# Patient Record
Sex: MALE | Race: WHITE | Employment: OTHER | URBAN - METROPOLITAN AREA
[De-identification: names, ages, dates, MRNs, and addresses within clinical notes are randomized per-mention and may not be internally consistent; named-entity substitution may affect disease eponyms.]

---

## 2018-08-12 ENCOUNTER — TRANSFERRED RECORDS (OUTPATIENT)
Dept: HEALTH INFORMATION MANAGEMENT | Facility: CLINIC | Age: 73
End: 2018-08-12

## 2018-08-21 ENCOUNTER — TRANSFERRED RECORDS (OUTPATIENT)
Dept: HEALTH INFORMATION MANAGEMENT | Facility: CLINIC | Age: 73
End: 2018-08-21

## 2018-09-04 ENCOUNTER — MEDICAL CORRESPONDENCE (OUTPATIENT)
Dept: HEALTH INFORMATION MANAGEMENT | Facility: CLINIC | Age: 73
End: 2018-09-04

## 2018-09-11 ENCOUNTER — TRANSFERRED RECORDS (OUTPATIENT)
Dept: HEALTH INFORMATION MANAGEMENT | Facility: CLINIC | Age: 73
End: 2018-09-11

## 2019-01-01 ENCOUNTER — TRANSFERRED RECORDS (OUTPATIENT)
Dept: HEALTH INFORMATION MANAGEMENT | Facility: CLINIC | Age: 74
End: 2019-01-01

## 2019-03-28 ENCOUNTER — TRANSFERRED RECORDS (OUTPATIENT)
Dept: HEALTH INFORMATION MANAGEMENT | Facility: CLINIC | Age: 74
End: 2019-03-28

## 2020-01-01 ENCOUNTER — OFFICE VISIT (OUTPATIENT)
Dept: GASTROENTEROLOGY | Facility: CLINIC | Age: 75
End: 2020-01-01
Attending: INTERNAL MEDICINE
Payer: MEDICARE

## 2020-01-01 ENCOUNTER — PATIENT OUTREACH (OUTPATIENT)
Dept: GASTROENTEROLOGY | Facility: CLINIC | Age: 75
End: 2020-01-01

## 2020-01-01 ENCOUNTER — TRANSFERRED RECORDS (OUTPATIENT)
Dept: HEALTH INFORMATION MANAGEMENT | Facility: CLINIC | Age: 75
End: 2020-01-01

## 2020-01-01 ENCOUNTER — EXTERNAL ORDER RESULTS (OUTPATIENT)
Dept: GASTROENTEROLOGY | Facility: CLINIC | Age: 75
End: 2020-01-01

## 2020-01-01 ENCOUNTER — PRE VISIT (OUTPATIENT)
Dept: GASTROENTEROLOGY | Facility: CLINIC | Age: 75
End: 2020-01-01

## 2020-01-01 ENCOUNTER — TELEPHONE (OUTPATIENT)
Dept: GASTROENTEROLOGY | Facility: CLINIC | Age: 75
End: 2020-01-01

## 2020-01-01 VITALS
SYSTOLIC BLOOD PRESSURE: 125 MMHG | TEMPERATURE: 97.6 F | HEART RATE: 68 BPM | DIASTOLIC BLOOD PRESSURE: 74 MMHG | OXYGEN SATURATION: 96 % | WEIGHT: 286.1 LBS

## 2020-01-01 DIAGNOSIS — R18.8 CIRRHOSIS OF LIVER WITH ASCITES, UNSPECIFIED HEPATIC CIRRHOSIS TYPE (H): Primary | ICD-10-CM

## 2020-01-01 DIAGNOSIS — R18.8 CIRRHOSIS OF LIVER WITH ASCITES, UNSPECIFIED HEPATIC CIRRHOSIS TYPE (H): ICD-10-CM

## 2020-01-01 DIAGNOSIS — K74.60 CIRRHOSIS OF LIVER WITH ASCITES, UNSPECIFIED HEPATIC CIRRHOSIS TYPE (H): ICD-10-CM

## 2020-01-01 DIAGNOSIS — K74.60 CIRRHOSIS OF LIVER WITH ASCITES, UNSPECIFIED HEPATIC CIRRHOSIS TYPE (H): Primary | ICD-10-CM

## 2020-01-01 LAB
ALBUMIN SERPL-MCNC: 2.7 G/DL (ref 3.4–5)
ALBUMIN SERPL-MCNC: 3 G/DL
ALP SERPL-CCNC: 168 U/L (ref 40–150)
ALP SERPL-CCNC: 184 U/L
ALT SERPL W P-5'-P-CCNC: 42 U/L (ref 0–70)
ALT SERPL-CCNC: 48 U/L
ANION GAP SERPL CALCULATED.3IONS-SCNC: 10 MMOL/L
ANION GAP SERPL CALCULATED.3IONS-SCNC: 5 MMOL/L
ANION GAP SERPL CALCULATED.3IONS-SCNC: 7 MMOL/L
ANION GAP SERPL CALCULATED.3IONS-SCNC: 8 MMOL/L
ANION GAP SERPL CALCULATED.3IONS-SCNC: 8 MMOL/L (ref 3–14)
AST SERPL W P-5'-P-CCNC: 55 U/L (ref 0–45)
AST SERPL-CCNC: 62 U/L
BILIRUB DIRECT SERPL-MCNC: 0.9 MG/DL (ref 0–0.2)
BILIRUB SERPL-MCNC: 2.9 MG/DL (ref 0.2–1.3)
BILIRUB SERPL-MCNC: 4.8 MG/DL
BILIRUBIN DIRECT: 2.2 MG/DL
BUN SERPL-MCNC: 22 MG/DL
BUN SERPL-MCNC: 23 MG/DL
BUN SERPL-MCNC: 24 MG/DL (ref 7–30)
BUN SERPL-MCNC: 27 MG/DL
BUN SERPL-MCNC: 45 MG/DL
CALCIUM SERPL-MCNC: 9.2 MG/DL
CALCIUM SERPL-MCNC: 9.3 MG/DL
CALCIUM SERPL-MCNC: 9.5 MG/DL
CALCIUM SERPL-MCNC: 9.7 MG/DL (ref 8.5–10.1)
CALCIUM SERPL-MCNC: 9.9 MG/DL
CHLORIDE SERPL-SCNC: 106 MMOL/L (ref 94–109)
CHLORIDE SERPLBLD-SCNC: 102 MMOL/L
CHLORIDE SERPLBLD-SCNC: 103 MMOL/L
CHLORIDE SERPLBLD-SCNC: 103 MMOL/L
CHLORIDE SERPLBLD-SCNC: 104 MMOL/L
CO2 SERPL-SCNC: 22 MMOL/L
CO2 SERPL-SCNC: 23 MMOL/L (ref 20–32)
CO2 SERPL-SCNC: 25 MMOL/L
CO2 SERPL-SCNC: 26 MMOL/L
CO2 SERPL-SCNC: 28 MMOL/L
CREAT SERPL-MCNC: 1.14 MG/DL
CREAT SERPL-MCNC: 1.15 MG/DL (ref 0.66–1.25)
CREAT SERPL-MCNC: 1.36 MG/DL
CREAT SERPL-MCNC: 1.38 MG/DL
CREAT SERPL-MCNC: 1.39 MG/DL
ERYTHROCYTE [DISTWIDTH] IN BLOOD BY AUTOMATED COUNT: 16.9 % (ref 10–15)
GFR SERPL CREATININE-BSD FRML MDRD: 50 ML/MIN/1.73M2
GFR SERPL CREATININE-BSD FRML MDRD: 51 ML/MIN/1.73M2
GFR SERPL CREATININE-BSD FRML MDRD: 51 ML/MIN/1.73M2
GFR SERPL CREATININE-BSD FRML MDRD: 63 ML/MIN/{1.73_M2}
GFR SERPL CREATININE-BSD FRML MDRD: >60 ML/MIN/1.73M2
GLUCOSE SERPL-MCNC: 109 MG/DL (ref 70–99)
GLUCOSE SERPL-MCNC: 115 MG/DL (ref 70–99)
GLUCOSE SERPL-MCNC: 122 MG/DL (ref 70–99)
GLUCOSE SERPL-MCNC: 213 MG/DL (ref 70–99)
GLUCOSE SERPL-MCNC: 75 MG/DL (ref 70–99)
HCT VFR BLD AUTO: 42.6 % (ref 40–53)
HGB BLD-MCNC: 14.2 G/DL (ref 13.3–17.7)
INR PPP: 1.68 (ref 0.86–1.14)
MCH RBC QN AUTO: 34.7 PG (ref 26.5–33)
MCHC RBC AUTO-ENTMCNC: 33.3 G/DL (ref 31.5–36.5)
MCV RBC AUTO: 104 FL (ref 78–100)
PLATELET # BLD AUTO: 132 10E9/L (ref 150–450)
POTASSIUM SERPL-SCNC: 4 MMOL/L
POTASSIUM SERPL-SCNC: 4.1 MMOL/L
POTASSIUM SERPL-SCNC: 4.2 MMOL/L (ref 3.4–5.3)
POTASSIUM SERPL-SCNC: 4.4 MMOL/L
POTASSIUM SERPL-SCNC: 5.1 MMOL/L
PROT SERPL-MCNC: 6.2 G/DL
PROT SERPL-MCNC: 7.3 G/DL (ref 6.8–8.8)
RBC # BLD AUTO: 4.09 10E12/L (ref 4.4–5.9)
SODIUM SERPL-SCNC: 131 MMOL/L
SODIUM SERPL-SCNC: 136 MMOL/L
SODIUM SERPL-SCNC: 137 MMOL/L
SODIUM SERPL-SCNC: 138 MMOL/L (ref 133–144)
SODIUM SERPL-SCNC: 139 MMOL/L
WBC # BLD AUTO: 10.1 10E9/L (ref 4–11)

## 2020-01-01 PROCEDURE — G0463 HOSPITAL OUTPT CLINIC VISIT: HCPCS | Mod: ZF

## 2020-01-01 PROCEDURE — 80048 BASIC METABOLIC PNL TOTAL CA: CPT | Performed by: INTERNAL MEDICINE

## 2020-01-01 PROCEDURE — 80076 HEPATIC FUNCTION PANEL: CPT | Performed by: INTERNAL MEDICINE

## 2020-01-01 PROCEDURE — 36415 COLL VENOUS BLD VENIPUNCTURE: CPT | Performed by: INTERNAL MEDICINE

## 2020-01-01 PROCEDURE — 85027 COMPLETE CBC AUTOMATED: CPT | Performed by: INTERNAL MEDICINE

## 2020-01-01 PROCEDURE — 85610 PROTHROMBIN TIME: CPT | Performed by: INTERNAL MEDICINE

## 2020-01-01 RX ORDER — SPIRONOLACTONE 50 MG/1
150 TABLET, FILM COATED ORAL DAILY
Qty: 90 TABLET | Refills: 3 | Status: SHIPPED | OUTPATIENT
Start: 2020-01-01 | End: 2020-01-01

## 2020-01-01 RX ORDER — FUROSEMIDE 40 MG
40 TABLET ORAL DAILY
Qty: 90 TABLET | Refills: 0 | Status: SHIPPED | OUTPATIENT
Start: 2020-01-01 | End: 2020-01-01

## 2020-01-01 RX ORDER — FUROSEMIDE 40 MG
80 TABLET ORAL DAILY
Qty: 60 TABLET | Refills: 11 | Status: SHIPPED | OUTPATIENT
Start: 2020-01-01 | End: 2020-01-01

## 2020-01-01 RX ORDER — PRAVASTATIN SODIUM 80 MG/1
80 TABLET ORAL DAILY
COMMUNITY
Start: 2019-01-01

## 2020-01-01 RX ORDER — SPIRONOLACTONE 25 MG/1
50 TABLET ORAL DAILY
COMMUNITY
Start: 2020-01-01 | End: 2020-01-01

## 2020-01-01 RX ORDER — LACTULOSE 10 G/15ML
20 SOLUTION ORAL 2 TIMES DAILY
COMMUNITY
Start: 2019-05-02

## 2020-01-01 RX ORDER — FUROSEMIDE 20 MG
60 TABLET ORAL DAILY
Qty: 90 TABLET | Refills: 3 | Status: SHIPPED | OUTPATIENT
Start: 2020-01-01 | End: 2020-01-01

## 2020-01-01 RX ORDER — VENLAFAXINE HYDROCHLORIDE 150 MG/1
150 CAPSULE, EXTENDED RELEASE ORAL DAILY
COMMUNITY
Start: 2020-01-01

## 2020-01-01 RX ORDER — SPIRONOLACTONE 50 MG/1
100 TABLET, FILM COATED ORAL DAILY
COMMUNITY
Start: 2020-01-01

## 2020-01-01 RX ORDER — CARVEDILOL 6.25 MG/1
6.25 TABLET ORAL 2 TIMES DAILY
COMMUNITY
Start: 2019-01-01 | End: 2020-01-01 | Stop reason: SINTOL

## 2020-01-01 RX ORDER — LEVOTHYROXINE SODIUM 175 UG/1
175 TABLET ORAL DAILY
COMMUNITY
Start: 2019-01-01

## 2020-01-01 RX ORDER — SPIRONOLACTONE 100 MG/1
200 TABLET, FILM COATED ORAL DAILY
Qty: 60 TABLET | Refills: 11 | Status: SHIPPED | OUTPATIENT
Start: 2020-01-01 | End: 2020-01-01

## 2020-01-01 RX ORDER — FUROSEMIDE 20 MG
20 TABLET ORAL DAILY
COMMUNITY
Start: 2019-01-01 | End: 2020-01-01

## 2020-01-01 RX ORDER — FUROSEMIDE 20 MG
40 TABLET ORAL DAILY
COMMUNITY
Start: 2020-01-01

## 2020-01-01 RX ORDER — SPIRONOLACTONE 100 MG/1
100 TABLET, FILM COATED ORAL DAILY
Qty: 90 TABLET | Refills: 0 | Status: SHIPPED | OUTPATIENT
Start: 2020-01-01 | End: 2020-01-01

## 2020-01-01 ASSESSMENT — PAIN SCALES - GENERAL: PAINLEVEL: SEVERE PAIN (6)

## 2020-01-17 NOTE — TELEPHONE ENCOUNTER
Action    Action Taken 01.17.2020 Pending recs.    01.22.22.2020 Called the pt at [phone] spoke to the pt spouse who states the pt is currently in the hospital . [facility] [place] and she will calling them again today to get the records fax over.    01.23.2020 Records received.     amand  RECORDS RECEIVED FROM: External - New, cirrhosis per wife, med recs will be faxed   DATE RECEIVED: 02.03.2020   NOTES STATUS DETAILS   OFFICE NOTE from referring provider [facility]   OFFICE NOTES from other specialists N/A    DISCHARGE SUMMARY from hospital N/A    MEDICATION LIST Received 01.22.2020   LIVER BIOSPY (IF APPLICABLE)      PATHOLOGY REPORTS  N/A    IMAGING     ENDOSCOPY (IF AVAILABLE) Received 01.22.2020   COLONOSCOPY (IF AVAILABLE) Received 01.22.2020    ULTRASOUND LIVER N/A    CT OF ABDOMEN Received 01.22.2020   MRI OF LIVER N/A    FIBROSCAN, US ELASTOGRAPHY, FIBROSIS SCAN, MR ELASTOGRAPHY N/A    LABS     HEPATIC PANEL (LIVER PANEL) N/A    BASIC METABOLIC PANEL N/A    COMPLETE METABOLIC PANEL N/A    COMPLETE BLOOD COUNT (CBC) N/A    INTERNATIONAL NORMALIZED RATIO (INR) N/A    HEPATITIS C ANTIBODY N/A    HEPATITIS C VIRAL LOAD/PCR N/A    HEPATITIS C GENOTYPE N/A    HEPATITIS B SURFACE ANTIGEN N/A    HEPATITIS B SURFACE ANTIBODY N./A    HEPATITIS B DNA QUANT LEVEL N/A    HEPATITIS B CORE ANTIBODY N/A

## 2020-01-30 NOTE — TELEPHONE ENCOUNTER
Ximena contacted and reminded of pt upcoming appointment.  Patient instructed to bring updated medications list to appointment.

## 2020-02-03 NOTE — PATIENT INSTRUCTIONS
Plan  1.  Increase furosemide to 40mg per day  2.  Increase spironolactone to 100mg per day  3.  Low salt diet (max 2g salt per day)  4.  My nurse will set up a paracentesis for you in [place]  5.  Follow-up with me in 3 months    Oscar Acuña MD  Hepatology  [facility]

## 2020-02-03 NOTE — PROGRESS NOTES
[facility]    Hepatology New Patient Visit    Referring provider:  Walter Eli      74 year old male    Chief complaint:  Cirrhosis     HPI:  Cirrhosis  - dx ~2017  - ?ETOH/COLINDRES  - hx ascites  - hx HE  - no hx variceal bleeding  - last EGD June 2019- grade II EV, moderate portal hypertensive gastropathy  - HCC screening- abd U/S Aug 2019    The patient comes to clinic this afternoon with his wife for further evaluation and management of cirrhosis.  This was diagnosed in 2017.  The patient was followed for a brief period at the [facility] by Dr Faulkner.  In the recent past, the patient has had issues with hepatic encephalopathy and ascites.  His last upper endoscopy was in 06/2019.  He was admitted to hospital in [facility] in December while on a cruise for worsening ascites.  The patient was recently admitted to the hospital last week in [place] again with ascites.      The patient reports that his ascites has been developing over the past 2 months.  He had a paracentesis when he was admitted to the hospital last week with 8.5L removed.  He is currently on low doses of diuretics and is doing his best to adhere to a low-sodium diet.  He denies any lower extremity edema, although he does report some dyspnea on exertion.      The patient does have a history of confusion, but his wife reports that this has resolved since taking lactulose and rifaximin.  He is currently moving his bowels anywhere from 2-7 times per day.      The patient denies jaundice, melena, hematemesis or hematochezia.      The patient denies any fevers, sweats or chills.      Weight is currently 279lbs.  When the patient left the hospital last week, he weighed 265lbs.  Appetite has been poor due to nausea related to ascites.      The patient last drank alcohol 4-5 years ago.  At that time, he was drinking 1 beer per week.  He reports he drank very heavily in his 20s-30s.  He denies any DUIs.      The patient has  never smoked.  He denies any recreational drug use, including marijuana, IN or IV drugs.       Medical hx Surgical hx   Past Medical History:   Diagnosis Date     Benign neuroendocrine tumor of duodenum 2018     Cirrhosis (H) 2017     Depression with anxiety      Diet-controlled type 2 diabetes mellitus (H)      Hyperlipidemia      Hypertension      Hypothyroidism      Osteoarthritis       Past Surgical History:   Procedure Laterality Date     FUSION CERVICAL ANTERIOR ONE LEVEL       STRIP VEIN       TONSILLECTOMY            Medications  Prior to Admission medications    Medication Sig Start Date End Date Taking? Authorizing Provider   carvedilol (COREG) 6.25 MG tablet Take 6.25 mg by mouth 2 times daily 6/28/19  Yes Reported, Patient   furosemide (LASIX) 40 MG tablet Take 1 tablet (40 mg) by mouth daily 2/3/20 5/3/20 Yes Oscar Dominguez MD   lactulose (CHRONULAC) 10 GM/15ML solution Take 20 g by mouth 2 times daily 5/2/19  Yes Reported, Patient   levothyroxine (SYNTHROID) 175 MCG tablet Take 175 mcg by mouth daily 9/26/19  Yes Reported, Patient   pravastatin (PRAVACHOL) 80 MG tablet Take 80 mg by mouth daily 8/29/19  Yes Reported, Patient   rifaximin (XIFAXAN) 550 MG TABS tablet Take 550 mg by mouth 2 times daily 2/12/19  Yes Reported, Patient   spironolactone (ALDACTONE) 100 MG tablet Take 1 tablet (100 mg) by mouth daily 2/3/20 5/3/20 Yes Oscar Dominguez MD   venlafaxine (EFFEXOR-XR) 150 MG 24 hr capsule Take 150 mg by mouth daily 1/7/20  Yes Reported, Patient       Allergies  No Known Allergies    Family hx Social hx   Family History   Problem Relation Age of Onset     Breast Cancer Mother      Coronary Artery Disease Father      Colon Cancer No family hx of      Liver Disease No family hx of       Social History     Tobacco Use     Smoking status: Never Smoker     Smokeless tobacco: Never Used   Substance Use Topics     Alcohol use: Not Currently     Drug use: Never     Lives in [place] with  wife.  4 healthy adult children.  Retired.  Previously worked as a manager for [employer].     Review of systems  A 10-point review of systems was negative.    Examination  /74 (BP Location: Right arm, Patient Position: Sitting, Cuff Size: Adult Large)   Pulse 68   Temp 97.6  F (36.4  C) (Oral)   Wt 129.8 kg (286 lb 1.6 oz)   SpO2 96%   There is no height or weight on file to calculate BMI.    Gen- well, NAD, A+Ox3, normal color  Eye- EOMI  ENT- MMM, normal oropharynx  Lym- no palpable lymphadenopathy  CVS- S1, S2 normal, no added sounds, RRR  RS- CTA  Abd- distended, soft, non-tender, dull to percuss, no obvious organomegaly on palpation or percussion, BS+  Extr- pulses good, no DELLA  MS- hands normal- no clubbing  Neuro- A+Ox3, no asterixis  Skin- no rash or jaundice  Psych- normal mood    Laboratory  1/22/2020  WCC 6.9, hgb 12.3, plt 83    INR 1.6    Na 140, K 3.7, BUN 17, Cr 0.85    TB 2.8, direct bili 1.0, ALT 31, AST 58, AlkPh 122    Tot Prot 5.5, Alb 2.6    HCV Ab neg  ASMA neg    Ascitic fluid , N= 3%, albumin<1.0, total protein <2.0    Radiology  Abd U/S Aug 2019 reviewed  EGD Jun 2019 reviewed    Assessment  74-year-old male who presents for further evaluation and management of decompensated EtOH/COLINDRES cirrhosis complicated with ascites.  MELD-Na= 16.  Will obtain therapeutic paracentesis for ascites in addition to increasing the doses of diuretics.  If the patient develops side effects or fails to respond to additional doses of diuretics, he would be a good candidate for TIPS as he is not a candidate for liver transplantation due to his advanced age.  Up to date with surveillance of esophageal varices.  Up to date with HCC screening.     Plan  1.  Increase furosemide to 40mg PO Q24  2.  Increase spironolactone to 100mg PO Q24  3.  Therapeutic paracentesis- check cytology  4.  Continue lactulose and rifaximin  5.  Follow-up in 3 months    Oscar Acuña MD  Hepatology  [facility]

## 2020-02-03 NOTE — LETTER
2/3/2020       RE: Lamin Wells  [address] [address]     Dear Colleague,    Thank you for referring your patient, Lamin Wells, to the [facility] HEPATOLOGY at [facility]. Please see a copy of my visit note below.    [facility]    Hepatology New Patient Visit    Referring provider:  Karan Huska      74 year old male    Chief complaint:  Cirrhosis     HPI:  Cirrhosis  - dx ~2017  - ?ETOH/COLINDRES  - hx ascites  - hx HE  - no hx variceal bleeding  - last EGD June 2019- grade II EV, moderate portal hypertensive gastropathy  - HCC screening- abd U/S Aug 2019    The patient comes to clinic this afternoon with his wife for further evaluation and management of cirrhosis.  This was diagnosed in 2017.  The patient was followed for a brief period at the [facility] by Dr Faulkner.  In the recent past, the patient has had issues with hepatic encephalopathy and ascites.  His last upper endoscopy was in 06/2019.  He was admitted to hospital in [facility] in December while on a cruise for worsening ascites.  The patient was recently admitted to the hospital last week in [place] again with ascites.      The patient reports that his ascites has been developing over the past 2 months.  He had a paracentesis when he was admitted to the hospital last week with 8.5L removed.  He is currently on low doses of diuretics and is doing his best to adhere to a low-sodium diet.  He denies any lower extremity edema, although he does report some dyspnea on exertion.      The patient does have a history of confusion, but his wife reports that this has resolved since taking lactulose and rifaximin.  He is currently moving his bowels anywhere from 2-7 times per day.      The patient denies jaundice, melena, hematemesis or hematochezia.      The patient denies any fevers, sweats or chills.      Weight is currently 279lbs.  When the patient left the hospital last  week, he weighed 265lbs.  Appetite has been poor due to nausea related to ascites.      The patient last drank alcohol 4-5 years ago.  At that time, he was drinking 1 beer per week.  He reports he drank very heavily in his 20s-30s.  He denies any DUIs.      The patient has never smoked.  He denies any recreational drug use, including marijuana, IN or IV drugs.       Medical hx Surgical hx   Past Medical History:   Diagnosis Date     Benign neuroendocrine tumor of duodenum 2018     Cirrhosis (H) 2017     Depression with anxiety      Diet-controlled type 2 diabetes mellitus (H)      Hyperlipidemia      Hypertension      Hypothyroidism      Osteoarthritis       Past Surgical History:   Procedure Laterality Date     FUSION CERVICAL ANTERIOR ONE LEVEL       STRIP VEIN       TONSILLECTOMY            Medications  Prior to Admission medications    Medication Sig Start Date End Date Taking? Authorizing Provider   carvedilol (COREG) 6.25 MG tablet Take 6.25 mg by mouth 2 times daily 6/28/19  Yes Reported, Patient   furosemide (LASIX) 40 MG tablet Take 1 tablet (40 mg) by mouth daily 2/3/20 5/3/20 Yes Oscar Dominguez MD   lactulose (CHRONULAC) 10 GM/15ML solution Take 20 g by mouth 2 times daily 5/2/19  Yes Reported, Patient   levothyroxine (SYNTHROID) 175 MCG tablet Take 175 mcg by mouth daily 9/26/19  Yes Reported, Patient   pravastatin (PRAVACHOL) 80 MG tablet Take 80 mg by mouth daily 8/29/19  Yes Reported, Patient   rifaximin (XIFAXAN) 550 MG TABS tablet Take 550 mg by mouth 2 times daily 2/12/19  Yes Reported, Patient   spironolactone (ALDACTONE) 100 MG tablet Take 1 tablet (100 mg) by mouth daily 2/3/20 5/3/20 Yes Oscar Dominguez MD   venlafaxine (EFFEXOR-XR) 150 MG 24 hr capsule Take 150 mg by mouth daily 1/7/20  Yes Reported, Patient       Allergies  No Known Allergies    Family hx Social hx   Family History   Problem Relation Age of Onset     Breast Cancer Mother      Coronary Artery Disease Father       Colon Cancer No family hx of      Liver Disease No family hx of       Social History     Tobacco Use     Smoking status: Never Smoker     Smokeless tobacco: Never Used   Substance Use Topics     Alcohol use: Not Currently     Drug use: Never     Lives in [place] with wife.  4 healthy adult children.  Retired.  Previously worked as a manager for [employer].     Review of systems  A 10-point review of systems was negative.    Examination  /74 (BP Location: Right arm, Patient Position: Sitting, Cuff Size: Adult Large)   Pulse 68   Temp 97.6  F (36.4  C) (Oral)   Wt 129.8 kg (286 lb 1.6 oz)   SpO2 96%   There is no height or weight on file to calculate BMI.    Gen- well, NAD, A+Ox3, normal color  Eye- EOMI  ENT- MMM, normal oropharynx  Lym- no palpable lymphadenopathy  CVS- S1, S2 normal, no added sounds, RRR  RS- CTA  Abd- distended, soft, non-tender, dull to percuss, no obvious organomegaly on palpation or percussion, BS+  Extr- pulses good, no DELLA  MS- hands normal- no clubbing  Neuro- A+Ox3, no asterixis  Skin- no rash or jaundice  Psych- normal mood    Laboratory  1/22/2020  WCC 6.9, hgb 12.3, plt 83    INR 1.6    Na 140, K 3.7, BUN 17, Cr 0.85    TB 2.8, direct bili 1.0, ALT 31, AST 58, AlkPh 122    Tot Prot 5.5, Alb 2.6    HCV Ab neg  ASMA neg    Ascitic fluid , N= 3%, albumin<1.0, total protein <2.0    Radiology  Abd U/S Aug 2019 reviewed  EGD Jun 2019 reviewed    Assessment  74-year-old male who presents for further evaluation and management of decompensated EtOH/COLINDRES cirrhosis complicated with ascites.  MELD-Na= 16.  Will obtain therapeutic paracentesis for ascites in addition to increasing the doses of diuretics.  If the patient develops side effects or fails to respond to additional doses of diuretics, he would be a good candidate for TIPS as he is not a candidate for liver transplantation due to his advanced age.  Up to date with surveillance of esophageal varices.  Up to date with HCC  screening.     Plan  1.  Increase furosemide to 40mg PO Q24  2.  Increase spironolactone to 100mg PO Q24  3.  Therapeutic paracentesis- check cytology  4.  Continue lactulose and rifaximin  5.  Follow-up in 3 months    Oscar Acuña MD  Hepatology  [facility]

## 2020-02-03 NOTE — NURSING NOTE
Chief Complaint   Patient presents with     Consult     Cirrhosis         /74 (BP Location: Right arm, Patient Position: Sitting, Cuff Size: Adult Large)   Pulse 68   Temp 97.6  F (36.4  C) (Oral)   Wt 129.8 kg (286 lb 1.6 oz)   SpO2 96%       Melanie Neville CMA    2/3/2020 3:20 PM

## 2020-02-03 NOTE — LETTER
2/3/2020      RE: Lamin BLUM Priscilla  [address] [address]       [facility]    Hepatology New Patient Visit    Referring provider:  Walter Eli      74 year old male    Chief complaint:  Cirrhosis     HPI:  Cirrhosis  - dx ~2017  - ?ETOH/COLINDRES  - hx ascites  - hx HE  - no hx variceal bleeding  - last EGD June 2019- grade II EV, moderate portal hypertensive gastropathy  - HCC screening- abd U/S Aug 2019    The patient comes to clinic this afternoon with his wife for further evaluation and management of cirrhosis.  This was diagnosed in 2017.  The patient was followed for a brief period at the [facility] by Dr Faulkner.  In the recent past, the patient has had issues with hepatic encephalopathy and ascites.  His last upper endoscopy was in 06/2019.  He was admitted to hospital in [facility] in December while on a cruise for worsening ascites.  The patient was recently admitted to the hospital last week in [place] again with ascites.      The patient reports that his ascites has been developing over the past 2 months.  He had a paracentesis when he was admitted to the hospital last week with 8.5L removed.  He is currently on low doses of diuretics and is doing his best to adhere to a low-sodium diet.  He denies any lower extremity edema, although he does report some dyspnea on exertion.      The patient does have a history of confusion, but his wife reports that this has resolved since taking lactulose and rifaximin.  He is currently moving his bowels anywhere from 2-7 times per day.      The patient denies jaundice, melena, hematemesis or hematochezia.      The patient denies any fevers, sweats or chills.      Weight is currently 279lbs.  When the patient left the hospital last week, he weighed 265lbs.  Appetite has been poor due to nausea related to ascites.      The patient last drank alcohol 4-5 years ago.  At that time, he was drinking 1 beer per week.  He reports he drank  very heavily in his 20s-30s.  He denies any DUIs.      The patient has never smoked.  He denies any recreational drug use, including marijuana, IN or IV drugs.       Medical hx Surgical hx   Past Medical History:   Diagnosis Date     Benign neuroendocrine tumor of duodenum 2018     Cirrhosis (H) 2017     Depression with anxiety      Diet-controlled type 2 diabetes mellitus (H)      Hyperlipidemia      Hypertension      Hypothyroidism      Osteoarthritis       Past Surgical History:   Procedure Laterality Date     FUSION CERVICAL ANTERIOR ONE LEVEL       STRIP VEIN       TONSILLECTOMY            Medications  Prior to Admission medications    Medication Sig Start Date End Date Taking? Authorizing Provider   carvedilol (COREG) 6.25 MG tablet Take 6.25 mg by mouth 2 times daily 6/28/19  Yes Reported, Patient   furosemide (LASIX) 40 MG tablet Take 1 tablet (40 mg) by mouth daily 2/3/20 5/3/20 Yes Oscar Dominguez MD   lactulose (CHRONULAC) 10 GM/15ML solution Take 20 g by mouth 2 times daily 5/2/19  Yes Reported, Patient   levothyroxine (SYNTHROID) 175 MCG tablet Take 175 mcg by mouth daily 9/26/19  Yes Reported, Patient   pravastatin (PRAVACHOL) 80 MG tablet Take 80 mg by mouth daily 8/29/19  Yes Reported, Patient   rifaximin (XIFAXAN) 550 MG TABS tablet Take 550 mg by mouth 2 times daily 2/12/19  Yes Reported, Patient   spironolactone (ALDACTONE) 100 MG tablet Take 1 tablet (100 mg) by mouth daily 2/3/20 5/3/20 Yes Oscar Dominguez MD   venlafaxine (EFFEXOR-XR) 150 MG 24 hr capsule Take 150 mg by mouth daily 1/7/20  Yes Reported, Patient       Allergies  No Known Allergies    Family hx Social hx   Family History   Problem Relation Age of Onset     Breast Cancer Mother      Coronary Artery Disease Father      Colon Cancer No family hx of      Liver Disease No family hx of       Social History     Tobacco Use     Smoking status: Never Smoker     Smokeless tobacco: Never Used   Substance Use Topics      Alcohol use: Not Currently     Drug use: Never     Lives in [place] with wife.  4 healthy adult children.  Retired.  Previously worked as a manager for [employer].     Review of systems  A 10-point review of systems was negative.    Examination  /74 (BP Location: Right arm, Patient Position: Sitting, Cuff Size: Adult Large)   Pulse 68   Temp 97.6  F (36.4  C) (Oral)   Wt 129.8 kg (286 lb 1.6 oz)   SpO2 96%   There is no height or weight on file to calculate BMI.    Gen- well, NAD, A+Ox3, normal color  Eye- EOMI  ENT- MMM, normal oropharynx  Lym- no palpable lymphadenopathy  CVS- S1, S2 normal, no added sounds, RRR  RS- CTA  Abd- distended, soft, non-tender, dull to percuss, no obvious organomegaly on palpation or percussion, BS+  Extr- pulses good, no DELLA  MS- hands normal- no clubbing  Neuro- A+Ox3, no asterixis  Skin- no rash or jaundice  Psych- normal mood    Laboratory  1/22/2020  WCC 6.9, hgb 12.3, plt 83    INR 1.6    Na 140, K 3.7, BUN 17, Cr 0.85    TB 2.8, direct bili 1.0, ALT 31, AST 58, AlkPh 122    Tot Prot 5.5, Alb 2.6    HCV Ab neg  ASMA neg    Ascitic fluid , N= 3%, albumin<1.0, total protein <2.0    Radiology  Abd U/S Aug 2019 reviewed  EGD Jun 2019 reviewed    Assessment  74-year-old male who presents for further evaluation and management of decompensated EtOH/COILNDRES cirrhosis complicated with ascites.  MELD-Na= 16.  Will obtain therapeutic paracentesis for ascites in addition to increasing the doses of diuretics.  If the patient develops side effects or fails to respond to additional doses of diuretics, he would be a good candidate for TIPS as he is not a candidate for liver transplantation due to his advanced age.  Up to date with surveillance of esophageal varices.  Up to date with HCC screening.     Plan  1.  Increase furosemide to 40mg PO Q24  2.  Increase spironolactone to 100mg PO Q24  3.  Therapeutic paracentesis- check cytology  4.  Continue lactulose and rifaximin  5.  Follow-up in  3 months    Oscar Acuña MD  Hepatology  [facility]        Oscar Acuña MD

## 2020-02-04 PROBLEM — R18.8 CIRRHOSIS OF LIVER WITH ASCITES, UNSPECIFIED HEPATIC CIRRHOSIS TYPE (H): Status: ACTIVE | Noted: 2020-01-01

## 2020-02-04 PROBLEM — K74.60 CIRRHOSIS OF LIVER WITH ASCITES, UNSPECIFIED HEPATIC CIRRHOSIS TYPE (H): Status: ACTIVE | Noted: 2020-01-01

## 2020-02-04 NOTE — PROGRESS NOTES
Met with pt and spouse, Ximena, to introduce self and RN care coordinator role during clinic visit with Dr. Acuña. Pt is a 74 year old male with cirrhosis (ETOH/COLINDRES?) with recent hospitalization (1/21-1/23) for increased shortness of breath which improved after paracentesis (8500 mL of ascites removed). Pt does not have outpatient paracentesis ordered and, per Dr. Acuña, pt will need assistance with arranging these. Pt lives in [place] and requested paracentesis be done at [facility]. Spoke with Macarena, imaging scheduler from [facility], and orders faxed (phone: [phone] and fax: [phone]). Notified pt that [facility] will reach out to pt to schedule first paracentesis.     Pt was discharged from hospital on furosemide 20 mg daily and spironolactone 50 mg daily. Per Dr. Acuña, pt will increase diuretics to furosemide 40 mg daily and spironolactone 100 mg daily and will need repeat labs in one week. Pt would also like to have labs done at [facility]. Order for BMP faxed to [phone].    Pt is taking lactulose 20 grams twice daily and xifaxan 550 mg twice daily for hepatic encephalopathy. Pt has 2-3 BMs per day and reviewed lactulose titration to achieve 3-5 BMs per day. Pt stated that he typically takes first dose of lactulose in the evening and second dose at bedtime. Advised pt to take first dose of lactulose in the morning with the rest of pt's morning medications.     Pt and Ximena verbalized understanding and are agreeable to plan. Pt will need follow in 3 months and writer plans to check in with pt in one week.

## 2020-02-10 NOTE — PROGRESS NOTES
Called pt to remind of lab work needed and to check in. Pt's wife, Ximena, stated that pt is doing well and had 9.7 liters of ascites removed at paracentesis on 2/5. Pt has not felt full since paracentesis and currently does not have another paracentesis scheduled. Encouraged pt to schedule an appointment as soon as he is starting to feel symptomatic so pt is not urgently trying to get an appointment.     Reviewed pt's medications and pt confirmed that he is still taking furosemide 40 mg daily and spironolactone 100 mg daily. Pt is also taking lactulose 30 ml twice daily and having at least 3 BMs per day.  Reviewed goal of 3-5 BMs per day and pt's ability to titrate lactulose as needed.    Pt plans to get lab work done tomorrow, 2/11. Writer spoke with [facility] lab abd requested lab results be faxed to the hepatology clinic. Will look for results and review with Dr. Acuña.

## 2020-02-12 NOTE — PROGRESS NOTES
Reviewed pt's BMP results with Dr. Acuña and, per Dr. Acuña:     We can go up to lasix 80 and spironolactone 200 then repeat labs again in a week.     Thanks,     Sabino     Called pt to review 's recommendations to increase diuretics. Pt agreeable and requested updated prescriptions be sent to [facility]. Notified pt that he will need to have repeat labs done next week and order for BMP faxed to [facility] lab. Discussed pt getting scheduled for next paracentesis and pt stated that he scheduled a paracentesis for tomorrow, 2/13.

## 2020-02-20 NOTE — PROGRESS NOTES
Reviewed pt's labs from 2/19 with Dr. Acuña and, per Dr Acuña:     Labs reviewed.  Cr= 1.39.     Reduce lasix to 60 and spironolactone to 150.     Repeat BMP in 1 week.     Notified pt's wife, Ximena, of lab results and medication changes. Updated prescriptions sent to pt's pharmacy. Reviewed that pt will need labs repeated in 1 weeks and lab order will be sent to [facility]. Ximena plans to  new prescriptions today after pt's paracentesis appointment.

## 2020-02-28 NOTE — PROGRESS NOTES
Connected with pt's wife, Ximena, to see if pt got labs drawn on 2/27 as previously planned. Ximena stated that pt got a paracentesis done yesterday but forgot to go labs. Ximena stated that pt will have these done today. Pt had 5.5 liters of ascites removed at paracentesis and next paracentesis is planned for 3/4. Ximena stated that she also [facility] and was wondering if she could either fax or mail it to the clinic. Provided Ximena with the clinic address and fax number. Will look for lab results and abdominal ultrasound report.

## 2020-03-02 NOTE — PROGRESS NOTES
Pt had repeat labs drawn on 2/28 and, per Dr. Acuña:     Labs reviewed.  BMP stable.  Continue same doses of diuretics.     Notified pt's wife, Ximena, of Dr. Acuña's recommendations to keep spironolactone at 150 mg daily and furosemide 60 mg daily. Ximena verbalized understanding of plan. Encouraged pt and Ximena to call with any questions/concerns. Will check in with pt in 1 week

## 2020-03-05 NOTE — PROGRESS NOTES
Received call from pt's wife, Ximena, stating that pt has been experiencing increased dizziness and hypotension with position changes. Ximena stated that pt's blood pressure was 100/60 while sitting and dropped to 60s/50s when standing. Reviewed pt's symptoms with Dr. Acuña and, per Dr. Acuña:     We should cut him back to 40 lasix and 100 spironolactone.  If we are hving difficulty controlling his ascites, he may need a TIPS.      Attempted to reach Ximena to discuss plan of care, no answer, message left requesting call back, number provided.

## 2020-03-06 NOTE — PROGRESS NOTES
Patient's wife returned call. Reviewed recommendations per Dr. Acuña to decrease Lasix to 40 mg daily and Spironolactone to 100 mg daily. Discussed safely measures for orthostatic hypotension, and to be seen if symptoms worsen over the weekend. Patient does not need refills at this time as she reports several bottles of each medication. New dosages noted in Epic. She has no further questions or concerns at this time. Will check in with patient early next week.

## 2020-03-11 NOTE — PROGRESS NOTES
"Ximena called notifying writer that pt has a paracentesis scheduled for tomorrow, 3/12. Reviewed pt's symptoms and Ximena stated that pt is still having issues with lightheadedness. Pt reported that he does not feel this has improved with decreasing diuretics. Ximena stated that pt's orthostatic hypotension is a chronic issue and hypotension usually improves when pt does \"his breathing exercises\". Pt is also working on getting a new CPAP which Ximena thinks will help with lightheadedness. Discussed continuing to monitoring pt's symptoms, frequency of paracentesis, and volume of ascites removed at each appointment. Ximena verbalized understanding and is agreeable to plan. Will check in with pt in 1 week.   "

## 2020-03-18 NOTE — PROGRESS NOTES
Received call from pt's wife, Ximena,reporting that pt is having increased shortness of breath, dizziness, and lethargy. Pt denied fevers but is having intermittent chills.   Ximena has been monitoring pt's blood pressure and it has been running 120s/60s. Pt has not been wearing CPAP at night and Ximena is wondering if this is contributing to pt's lethargy. Pt has been taking lactulose 20 grams twice daily and is having 3-4 BMs/day. Pt denied lower extremity edema and abdominal fullness. Reviewed pt's symptoms with Dr. Acuña and, per Dr. Acuña, pt needs to go to the ER for further evaluation. Relayed Dr. Acuña's recommendations with Ximena and Ximena plans to discuss this with pt. Plan to check in with pt in 1-2 days.

## 2020-03-20 NOTE — PROGRESS NOTES
Connected with Ximena to check in on pt. Ximena stated that pt did not go to the ER as recommended by Dr. Acuña. Per Ximena, pt is feeling much better after talking with his psychologist yesterday (3/19). Pt denied shortness of breath and has been less lethargic. Pt had paracentesis appointment scheduled for 3/20 but this has been rescheduled to 3/23. Reiterated with Ximena that pt should present to the ED if symptoms return. Ximena verbalized understanding and is agreeable to plan. Will check in with pt in 1-2 weeks.

## 2020-03-27 NOTE — PROGRESS NOTES
Received call from pt's wife, Ximena, stating that pt is on his way to [facility]. Xmiena reported that pt was having increased confusion and fell at home so Ximena called EMS. Ximena stated that pt has been compliant with medications and has been taking lactulose twice daily. Ximena was unsure how many BMs pt was having per day and stated that it is hard to keep track due to pt's incontinence. Ximena attempted to go with pt to the hospital but was notified that the hospital would not allow visitors due to Covid-19. Provided emotional support and emphasized that pt is getting the acute care that he needs at this time. Plan to check in on 3/30 and encouraged Ximena to call writer with any additional questions/concerns. Ximena verbalized understanding and is agreeable to plan.

## 2020-03-30 NOTE — PROGRESS NOTES
"Ximena stated that pt discharged from hospital on 10/29. Unable to view hospital encounter in care everywhere, but Ximena stated that pt's ammonia was \"high\",  pt was dehydrated, electrolytes were \"good\", and there was no infection. Notified Ximena that hepatology team is unable to see records from pt's hospitalization and encouraged Ximena to call [facility]'s medical records if pt would like hepatology team to be able to have access to these records.     Discussed lactulose regimen and keeping track of BMs. Acknowleged the challenge in tracking number of BMs with pt's incontinence but encouraged Ximena and pt to write down number of BMs pt has in the bathroom. Advised pt to increased lactulose to three times daily and titrate to achieve 3-5 BMs/day. Also emphasized the importance of xifaxan with prevention of hepatic encephalopathy.     Reviewed pt's discharge medication list. Discharge team did not make any changes to diuretics (furosemide 40 mg daily and spironolactone 100 mg daily). Reviewed the importance of pt staying hydrated and Ximena stated that she would encourage pt to drink more throughout the day.     Will notify Dr. Acuña of pt's hospitalization and follow up with any additional orders/recommendations.   "

## 2020-04-01 NOTE — PROGRESS NOTES
Ximena stated that pt is doing better and Ximena has not noticed pt experiencing any confusion and/or increased lethargy. Pt is taking lactulose 30 ml three times daily and had 4 BMs on 3/31. Pt has paracentesis scheduled for 4/6. Ximena denied swelling in pt's lower extremities and confirmed pt is taking furosemide 40 mg daily spironolactone 100 mg daily. Notified Ximena that Dr. Acuña ordered labs for pt to get drawn in 2 weeks (4/13) and orders faxed to [facility]. Ximena verbalized understanding and is agreeable to plan.

## 2020-04-13 NOTE — PROGRESS NOTES
"Ximena stated that pt is doing \"okay\". Per Ximena, pt continues to have intermittent confusion and dizziness. Pt is taking lactulose at least twice daily and 3 times per day if pt appears more confused. Ximena stated that she has not been tracking pt's BMs and has been dosing lactulose based on pt's mental status. Reviewed with Ximena that it is also important to track number of BMs and the number of BMs can provide insight to whether pt will start becoming encephalopathic. Ximena verbalized understanding.    Per Ximena, had labs and paracentesis done on 4/10. Pt continues to take furosemide 40 mg daily and spironolactone 100 mg daily. Ximena denied pt having lower extremity edema but did report that pt had 9 liters of ascites removed on 4/10. Pt is not watching sodium intake but is not eating much per Ximena. Reviewed the importance of restricting sodium to 2,000 mg or less and salt intake can impact pt's fluid accumulation. Plan to discuss lab and paracentesis results with Dr. Acuña and follow up with any additional orders or recommendations. Ximena verbalized understanding and is agreeable to plan.   "

## 2020-04-16 NOTE — PROGRESS NOTES
Received a call from pt's wife, Ximena, stating that EMS was called and pt is currently at [facility]. Ximena reported that pt complained of trouble breathing this morning and was only oriented to self. Per Ximena, pt took lactulose 30 ml twice yesterday and Ximena was unable to track number of stools due to fecal incontinence. Per Ximena, pt kept having diarrhea. Ximena stated that pt had 3 BMs on 4/14 and took lactulose three times. Pt did receive one dose of lactulose prior to EMS transporting pt to the hospital.    Discussed pt's shortness of breath and that this may have been caused by increased ascites. Ximena stated that pt's abdomen appeared distended and pt had a paracentesis scheduled for 4/17. Denied lower extremity edema.    Ximena reported that pt is getting weaker and hasn't been walking much. Pt and Ximena's son lives near by and has been helping Ximena with pt as needed. Discussed that pt will likely be evaluated by PT/OT while hospitalized and home care or a TCU may be recommended.     Plan to check in with Ximena on 4/17 but encouraged Ximena to call with any questions/concerns prior to check in. Ximena verbalized understanding and is agreeable to plan.

## 2020-04-20 NOTE — PROGRESS NOTES
Called pt's wife, Ximena, to check in. Ximean stated that pt is still in the hospital and the medical team thinks pt has pneumonia. Ximena spoke with pt on the phone yesterday, 4/19, and pt was still confused and stating that he was dying and asking why he was at the hospital. Ximena asked writer if pt's confusion will improve. Writer reviewed with Ximena that pt's mental status should improve as the medical team treats the underlying issue. Encouraged Ximena to direct any questions to the medical team these and have son also listen to the medical team's updates. Ximena stated that she is also waiting for a call from the hospital SW with an update on pt's needs at time of discharge. Discussed with Ximena that medical team might not be able to anticipate pt's needs until closer to discharge. Ximena verbalized understanding and is agreeable to plan.

## 2020-04-22 NOTE — PROGRESS NOTES
Connected with Ximena who stated that pt is still in the hospital. Per Ximena, pt is now alert and oriented and Ximena was able to have a logical conversation with pt last night. Social work spoke with Ximena about pt discharging to the inpatient rehab unit at [facility] when medically stable. Ximena and pt are discussing this option. Ximena plans to notify writer when pt discharges and writer will follow up with pt at this time.

## 2020-04-29 NOTE — PROGRESS NOTES
Recevied call from pt's wife, Ximena, stating that pt discharged to home on 4/24 and things have not been going well. Ximena stated that the original discharge plan was for pt to go to inpatient rehab at [facility] but pt did not want to go. During pt's hospitalization, pt had hallucinations and fell while trying to elope and subsequently was placed in restraints. Ximena stated that she got a hospital bed and commode to make discharge to home safe and possible for pt. Pt was discharged with home care and home care has been out to the home 3 times since discharge. Pt has been having hallucinations and diarrhea which Ximena initially attributed to the new prescription of trazodone. Ximena has not given pt any trazodone since 4/24 and pt continues to have diarrhea (approximately 10 BMs/day) and hallucinations. Per Ximena, pt is not sleeping and therefore either has Ximena. Pt fallen during the night attempting to get to the bathroom. Pt is taking lactulose 30 ml 2-3 times/day. Encouraged Ximena to decrease lactulose dose to twice daily  and see if this decreases diarrhea. Educated pt on C Diff and discussed that pt may have an infection causing the diarrhea.     Advised Ximena to request to meet with a  with home care and Ximena stated that one is coming out tomorrow, 4/30. Also encouraged Ximena to call pt's primary care provider to schedule a hospital follow up appointment and see if labs can be done and stool tested for C Diff. Ximena verbalized understanding and is agreeable to plan.

## 2020-04-30 NOTE — PROGRESS NOTES
"Ximena stated that pt had another \"bad night\". Pt was found wandering the house in the middle of the night and had urinated in drawers and a door to the house was wide open. Ximena and son, Akira, were sleeping in the same room as pt but Ximena stated that neither of them heard pt initially get up. Ximena met with a  from home care today and the  is going call Ximena this afternoon to provide additional resources for support.     Ximena stated that pt is currently refusing to take any medications  and is paranoid. Pt has not taken lactulose or xifaxan today and has not had any BMs. Per Ximena, pt had 2 BMs yesterday, 4/29. Encouraged Ximena to continue to offer lactulose and xifaxan to pt. Expressed concern that pt will become more confused if not taking these medications and having BMs.    Ximena reported that she spoke with pt's primary care provider on 4/29 and that the provider (Dr. Sands) ordered an ammonia level which was drawn and resulted within normal limits. Ximena stated the PCP was going to review pt's medications to see if any new medications could be causing pt's symptoms. Advised Ximena to bring pt to the emergency room if confusion and behaviors worsen and Ximena does not feel  pt is safe in the home. Ximena verbalized understanding and is agreeable to plan.Plan to check in with Ximena in 1 day.   "

## 2020-05-01 NOTE — PROGRESS NOTES
Ximena stated that pt is doing much better. Pt has been alert and oriented since yesterday afternoon. Pt is taking all medications and has taken lactulose once so far today. Per Ximena, pt had 2 BMs yesterday, 4/30. Pt's sister, Aida, is coming from the [facility] to stay and help with pt. Ximena stated the  with home care plans to talk with both Ximena and Aida about resources for support later today. Discussed pt's appointment with Dr. Acuña on 5/4 and whether the appointment should be rescheduled. Reviewed with Ximena that appointment will remain on 5/4 unless pt becomes disoriented again and Ximena will notify writer if this occurs. Lab orders faxed to [facility] and pt will get labs drawn early next week. Ximena verbalized understanding and is agreeable to plan.

## 2020-05-04 NOTE — PROGRESS NOTES
"Lamin Wells is a 74 year old male who is being evaluated via a billable telephone visit.      The patient has been notified of following:     \"This telephone visit will be conducted via a call between you and your physician/provider. We have found that certain health care needs can be provided without the need for a physical exam.  This service lets us provide the care you need with a short phone conversation.  If a prescription is necessary we can send it directly to your pharmacy.  If lab work is needed we can place an order for that and you can then stop by our lab to have the test done at a later time.    Telephone visits are billed at different rates depending on your insurance coverage. During this emergency period, for some insurers they may be billed the same as an in-person visit.  Please reach out to your insurance provider with any questions.    If during the course of the call the physician/provider feels a telephone visit is not appropriate, you will not be charged for this service.\"    Patient has given verbal consent for Telephone visit?  Yes    What phone number would you like to be contacted at? [phone]    How would you like to obtain your AVS? Mail a copy    Phone call duration: 27 minutes  _________________________________________________________________________    Telephone visit for cirrhosis.    Cirrhosis  - dx ~2017  - ?ETOH/COLINDRES  - hx ascites  - hx HE  - no hx variceal bleeding  - last EGD June 2019- grade II EV, moderate portal hypertensive gastropathy  - HCC screening- abd U/S ?    Last clinic visit Feb 2020.  Diuretics were increased further but reduced back to baseline doses due to orthostatic hypotension.  Patient was admitted to hospital last month with pneumonia.  In hospital, he was started on trazodone for insomnia.      Patient is unable to communicate clearly right now due to confusion.  This was a problem prior to admission to hospital but has worsened since discharge " from hospital.  Patient is not sleeping, constantly moving and urinating in random parts of his home.      Patient has been reporting frontal headaches since discharge home.  No neck pain or rash.    Patient is undergoing paracentesis weekly with removal of 7-9L fluid.  No lower extremity edema.      No jaundice, melena, hematemesis or hematochezia.    No fevers, sweats or chills.  No cough or dyspnea.    Meds reviewed.  Labs 4/10 reviewed.    Assessment  74 year old male with history of decompensated ETOH/COLINDRES cirrhosis complicated with ascites and HE.  Ascites suboptimally controlled on current diuretics.  Diuretic doses limited by renal dysfunction and orthostasis.  Patient's current behavioral problems are not consistent with HE.  Will discontinue trazodone but would recommend geriatric or psychiatric evaluation for possible dementia.  Would not consider patient for TIPS at this time until behavioral issues can be diagnosed and managed.  Up to date with surveillance of esophageal varices- will need follow-up endoscopy with discontinuation of carvedilol due to orthostasis).    Plan  1.  Discontinue trazodone  2.  Discontinue carvedilol  3.  Continue diuretics at current doses  4   Follow-up in 3 months    Oscar Acuña MD  Hepatology  [facility]

## 2020-05-05 NOTE — PROGRESS NOTES
Pt had telephone appointment with Dr. Acuña on 5/4. Received the following message from Dr. Acuña after appointment:    Yung Li,     I agree that this seems more consistent with delirium or dementia.     I recommended he stop trazodone as it was recently started and isn't helping.  I also recommended stopping carvedilol as his BP remains low at times.  (He will need EGD now that this has been stopped).     Can we check his ascites for SBP at next tap?     I told his wife that if his symptoms persist despite the med changes, he should get evaluated again for a different infection or for dementia.     Thanks,     Sabino     Lab orders placed for next paracentesis. Called [facility] imaging department for fax number to send new orders. Per staff, pt is currently admitted and in the ICU. Pt is scheduled to have a diagnostic and therapeutic paracentesis while inpatient today.     Spoke with Ximena who stated that she called EMS last night as pt could not get comfortable and was shaking uncontrollably. Ximena stated that she has not heard from [facility] and was not aware that pt was in the ICU. Ximena plans to call the hospital ASAP to get an update on pt's status. Plan to follow up with Ximena in 2-3 days.

## 2020-05-07 NOTE — PROGRESS NOTES
Connected with pt's wife, Ximena, who stated that pt is still hospitalized and in the ICU. Per Ximena, pt had a paracentesis during hospitalization and 6.6 liters of ascites removed. Diagnostic labs from paracentesis still in process. Ximena stated that pt has been intermittently confused and combative and is currently in restraints. Per Ximena, pt has also been hypotensive and ICU team is trying to stabilize pt's blood pressure. Plan to check in with Ximena in 4-5 days.

## 2020-05-14 NOTE — PROGRESS NOTES
Connected with Ximena who stated that pt is still in the ICU and will likely transition to hospice. Ximena stated that the ICU team has been updating Ximena daily on pt's medical status and, per Ximena, pt's kidneys are failing. Writer provided emotional support and encouraged Ximena to call with any questions/concerns.

## 2020-05-21 NOTE — PROGRESS NOTES
Connected with Ximena who stated that pt passed away last night (5/20) at 11 pm at [facility]. Provided emotional support and encouraged Ximena to call with any additional questions/concnerns. Will notify Dr. Acuña and HIM of pt's death.